# Patient Record
(demographics unavailable — no encounter records)

---

## 2024-11-14 NOTE — DISCUSSION/SUMMARY
[FreeTextEntry1] : Anticipatory guidance and parent education given.  History and physical consistent with pharyngitis. Rapid strep test negative in office, throat culture sent to lab. Will follow up by phone if throat culture results are positive. Advise supportive care. Tylenol or Motrin as needed for pain or fever. Increase fluids, rest. Follow up if symptoms persist or worsen.

## 2024-11-14 NOTE — HISTORY OF PRESENT ILLNESS
[de-identified] : fever, cough [FreeTextEntry6] : BIB mother for worsening cough/sore throat x2 days. 100.5 this morning, no medication administered. No SOB, difficulty breathing, chest pain. No n/v/d. No headache, abdominal pain or rash. No body aches or fatigue. Good po/uop/bm. Normal sleep and activity.